# Patient Record
Sex: FEMALE | Race: BLACK OR AFRICAN AMERICAN | Employment: UNEMPLOYED | URBAN - METROPOLITAN AREA
[De-identification: names, ages, dates, MRNs, and addresses within clinical notes are randomized per-mention and may not be internally consistent; named-entity substitution may affect disease eponyms.]

---

## 2020-11-18 ENCOUNTER — HOSPITAL ENCOUNTER (EMERGENCY)
Facility: CLINIC | Age: 40
Discharge: HOME | End: 2020-11-18
Attending: EMERGENCY MEDICINE | Admitting: EMERGENCY MEDICINE
Payer: OTHER GOVERNMENT

## 2020-11-18 ENCOUNTER — APPOINTMENT (OUTPATIENT)
Dept: GENERAL RADIOLOGY | Facility: CLINIC | Age: 40
End: 2020-11-18
Attending: EMERGENCY MEDICINE
Payer: OTHER GOVERNMENT

## 2020-11-18 VITALS
SYSTOLIC BLOOD PRESSURE: 135 MMHG | OXYGEN SATURATION: 98 % | RESPIRATION RATE: 10 BRPM | HEART RATE: 80 BPM | TEMPERATURE: 97.5 F | DIASTOLIC BLOOD PRESSURE: 79 MMHG

## 2020-11-18 DIAGNOSIS — B34.9 VIRAL ILLNESS: ICD-10-CM

## 2020-11-18 LAB
ALBUMIN SERPL-MCNC: 4.1 G/DL (ref 3.4–5)
ALBUMIN UR-MCNC: NEGATIVE MG/DL
ALP SERPL-CCNC: 57 U/L (ref 40–150)
ALT SERPL W P-5'-P-CCNC: 21 U/L (ref 0–50)
ANION GAP SERPL CALCULATED.3IONS-SCNC: 9 MMOL/L (ref 3–14)
APPEARANCE UR: CLEAR
AST SERPL W P-5'-P-CCNC: 12 U/L (ref 0–45)
BASOPHILS # BLD AUTO: 0 10E9/L (ref 0–0.2)
BASOPHILS NFR BLD AUTO: 0.2 %
BILIRUB SERPL-MCNC: 0.3 MG/DL (ref 0.2–1.3)
BILIRUB UR QL STRIP: NEGATIVE
BUN SERPL-MCNC: 10 MG/DL (ref 7–30)
CALCIUM SERPL-MCNC: 9 MG/DL (ref 8.5–10.1)
CAOX CRY #/AREA URNS HPF: ABNORMAL /HPF
CHLORIDE SERPL-SCNC: 104 MMOL/L (ref 94–109)
CO2 SERPL-SCNC: 23 MMOL/L (ref 20–32)
COLOR UR AUTO: ABNORMAL
CREAT SERPL-MCNC: 0.62 MG/DL (ref 0.52–1.04)
D DIMER PPP FEU-MCNC: 0.3 UG/ML FEU (ref 0–0.5)
DIFFERENTIAL METHOD BLD: ABNORMAL
EOSINOPHIL # BLD AUTO: 0 10E9/L (ref 0–0.7)
EOSINOPHIL NFR BLD AUTO: 0.4 %
ERYTHROCYTE [DISTWIDTH] IN BLOOD BY AUTOMATED COUNT: 14.7 % (ref 10–15)
GFR SERPL CREATININE-BSD FRML MDRD: >90 ML/MIN/{1.73_M2}
GLUCOSE SERPL-MCNC: 129 MG/DL (ref 70–99)
GLUCOSE UR STRIP-MCNC: NEGATIVE MG/DL
HCG UR QL: NEGATIVE
HCT VFR BLD AUTO: 33.6 % (ref 35–47)
HGB BLD-MCNC: 11.5 G/DL (ref 11.7–15.7)
HGB UR QL STRIP: ABNORMAL
IMM GRANULOCYTES # BLD: 0 10E9/L (ref 0–0.4)
IMM GRANULOCYTES NFR BLD: 0 %
INTERPRETATION ECG - MUSE: NORMAL
KETONES UR STRIP-MCNC: 5 MG/DL
LEUKOCYTE ESTERASE UR QL STRIP: NEGATIVE
LYMPHOCYTES # BLD AUTO: 1.5 10E9/L (ref 0.8–5.3)
LYMPHOCYTES NFR BLD AUTO: 31.9 %
MCH RBC QN AUTO: 28 PG (ref 26.5–33)
MCHC RBC AUTO-ENTMCNC: 34.2 G/DL (ref 31.5–36.5)
MCV RBC AUTO: 82 FL (ref 78–100)
MONOCYTES # BLD AUTO: 0.4 10E9/L (ref 0–1.3)
MONOCYTES NFR BLD AUTO: 8.3 %
MUCOUS THREADS #/AREA URNS LPF: PRESENT /LPF
NEUTROPHILS # BLD AUTO: 2.7 10E9/L (ref 1.6–8.3)
NEUTROPHILS NFR BLD AUTO: 59.2 %
NITRATE UR QL: NEGATIVE
PH UR STRIP: 6.5 PH (ref 5–7)
PLATELET # BLD AUTO: 237 10E9/L (ref 150–450)
POTASSIUM SERPL-SCNC: 2.9 MMOL/L (ref 3.4–5.3)
PROT SERPL-MCNC: 8.1 G/DL (ref 6.8–8.8)
RBC # BLD AUTO: 4.1 10E12/L (ref 3.8–5.2)
RBC #/AREA URNS AUTO: 1 /HPF (ref 0–2)
SODIUM SERPL-SCNC: 136 MMOL/L (ref 133–144)
SOURCE: ABNORMAL
SP GR UR STRIP: 1.01 (ref 1–1.03)
SQUAMOUS #/AREA URNS AUTO: 2 /HPF (ref 0–1)
TROPONIN I SERPL-MCNC: <0.015 UG/L (ref 0–0.04)
UROBILINOGEN UR STRIP-MCNC: NORMAL MG/DL (ref 0–2)
WBC # BLD AUTO: 4.6 10E9/L (ref 4–11)
WBC #/AREA URNS AUTO: 1 /HPF (ref 0–5)

## 2020-11-18 PROCEDURE — 96374 THER/PROPH/DIAG INJ IV PUSH: CPT

## 2020-11-18 PROCEDURE — 96375 TX/PRO/DX INJ NEW DRUG ADDON: CPT

## 2020-11-18 PROCEDURE — 258N000003 HC RX IP 258 OP 636: Performed by: EMERGENCY MEDICINE

## 2020-11-18 PROCEDURE — 84484 ASSAY OF TROPONIN QUANT: CPT | Performed by: EMERGENCY MEDICINE

## 2020-11-18 PROCEDURE — 85379 FIBRIN DEGRADATION QUANT: CPT | Performed by: EMERGENCY MEDICINE

## 2020-11-18 PROCEDURE — 81001 URINALYSIS AUTO W/SCOPE: CPT | Performed by: EMERGENCY MEDICINE

## 2020-11-18 PROCEDURE — 96361 HYDRATE IV INFUSION ADD-ON: CPT

## 2020-11-18 PROCEDURE — 81025 URINE PREGNANCY TEST: CPT | Performed by: EMERGENCY MEDICINE

## 2020-11-18 PROCEDURE — 99285 EMERGENCY DEPT VISIT HI MDM: CPT | Mod: 25

## 2020-11-18 PROCEDURE — 71045 X-RAY EXAM CHEST 1 VIEW: CPT

## 2020-11-18 PROCEDURE — 93005 ELECTROCARDIOGRAM TRACING: CPT

## 2020-11-18 PROCEDURE — C9803 HOPD COVID-19 SPEC COLLECT: HCPCS

## 2020-11-18 PROCEDURE — 250N000013 HC RX MED GY IP 250 OP 250 PS 637: Performed by: EMERGENCY MEDICINE

## 2020-11-18 PROCEDURE — 93005 ELECTROCARDIOGRAM TRACING: CPT | Mod: 76

## 2020-11-18 PROCEDURE — 85025 COMPLETE CBC W/AUTO DIFF WBC: CPT | Performed by: EMERGENCY MEDICINE

## 2020-11-18 PROCEDURE — U0003 INFECTIOUS AGENT DETECTION BY NUCLEIC ACID (DNA OR RNA); SEVERE ACUTE RESPIRATORY SYNDROME CORONAVIRUS 2 (SARS-COV-2) (CORONAVIRUS DISEASE [COVID-19]), AMPLIFIED PROBE TECHNIQUE, MAKING USE OF HIGH THROUGHPUT TECHNOLOGIES AS DESCRIBED BY CMS-2020-01-R: HCPCS | Performed by: EMERGENCY MEDICINE

## 2020-11-18 PROCEDURE — 80053 COMPREHEN METABOLIC PANEL: CPT | Performed by: EMERGENCY MEDICINE

## 2020-11-18 PROCEDURE — 250N000011 HC RX IP 250 OP 636: Performed by: EMERGENCY MEDICINE

## 2020-11-18 RX ORDER — METHYLPREDNISOLONE SODIUM SUCCINATE 125 MG/2ML
125 INJECTION, POWDER, LYOPHILIZED, FOR SOLUTION INTRAMUSCULAR; INTRAVENOUS ONCE
Status: COMPLETED | OUTPATIENT
Start: 2020-11-18 | End: 2020-11-18

## 2020-11-18 RX ORDER — ZINC SULFATE 50(220)MG
220 CAPSULE ORAL DAILY
Qty: 7 CAPSULE | Refills: 0 | Status: SHIPPED | OUTPATIENT
Start: 2020-11-18 | End: 2020-11-25

## 2020-11-18 RX ORDER — POTASSIUM CHLORIDE 1500 MG/1
40 TABLET, EXTENDED RELEASE ORAL ONCE
Status: COMPLETED | OUTPATIENT
Start: 2020-11-18 | End: 2020-11-18

## 2020-11-18 RX ORDER — SODIUM CHLORIDE 9 MG/ML
INJECTION, SOLUTION INTRAVENOUS CONTINUOUS
Status: DISCONTINUED | OUTPATIENT
Start: 2020-11-18 | End: 2020-11-18 | Stop reason: HOSPADM

## 2020-11-18 RX ORDER — ASCORBIC ACID 500 MG
500 TABLET ORAL 2 TIMES DAILY
Qty: 14 TABLET | Refills: 0 | Status: SHIPPED | OUTPATIENT
Start: 2020-11-18 | End: 2020-11-25

## 2020-11-18 RX ORDER — LORAZEPAM 2 MG/ML
1 INJECTION INTRAMUSCULAR ONCE
Status: COMPLETED | OUTPATIENT
Start: 2020-11-18 | End: 2020-11-18

## 2020-11-18 RX ORDER — PREDNISONE 20 MG/1
TABLET ORAL
Qty: 10 TABLET | Refills: 0 | Status: SHIPPED | OUTPATIENT
Start: 2020-11-18

## 2020-11-18 RX ORDER — KETOROLAC TROMETHAMINE 15 MG/ML
15 INJECTION, SOLUTION INTRAMUSCULAR; INTRAVENOUS ONCE
Status: COMPLETED | OUTPATIENT
Start: 2020-11-18 | End: 2020-11-18

## 2020-11-18 RX ADMIN — KETOROLAC TROMETHAMINE 15 MG: 15 INJECTION, SOLUTION INTRAMUSCULAR; INTRAVENOUS at 18:48

## 2020-11-18 RX ADMIN — SODIUM CHLORIDE: 9 INJECTION, SOLUTION INTRAVENOUS at 20:20

## 2020-11-18 RX ADMIN — POTASSIUM CHLORIDE 40 MEQ: 1500 TABLET, EXTENDED RELEASE ORAL at 20:19

## 2020-11-18 RX ADMIN — SODIUM CHLORIDE 1000 ML: 9 INJECTION, SOLUTION INTRAVENOUS at 18:48

## 2020-11-18 RX ADMIN — METHYLPREDNISOLONE SODIUM SUCCINATE 125 MG: 125 INJECTION, POWDER, FOR SOLUTION INTRAMUSCULAR; INTRAVENOUS at 18:48

## 2020-11-18 RX ADMIN — LORAZEPAM 1 MG: 2 INJECTION INTRAMUSCULAR; INTRAVENOUS at 20:21

## 2020-11-18 ASSESSMENT — ENCOUNTER SYMPTOMS
ARTHRALGIAS: 1
WEAKNESS: 0
CHILLS: 1
LIGHT-HEADEDNESS: 0
APPETITE CHANGE: 1
ABDOMINAL PAIN: 0
ACTIVITY CHANGE: 1
PALPITATIONS: 0
DIAPHORESIS: 0
COUGH: 1
MYALGIAS: 1
ABDOMINAL DISTENTION: 0
SHORTNESS OF BREATH: 1
NUMBNESS: 0
HEADACHES: 0
NAUSEA: 0
FEVER: 1
DIZZINESS: 0
NECK PAIN: 0
VOMITING: 0
FATIGUE: 1

## 2020-11-18 NOTE — ED AVS SNAPSHOT
[facility] Emergency Dept  [address] [address]  Phone: [phone]  Fax: [phone]                                    Seymour Max   MRN: [id number]    Department: [facility] Emergency Dept   Date of Visit: 11/18/2020           After Visit Summary Signature Page    I have received my discharge instructions, and my questions have been answered. I have discussed any challenges I see with this plan with the nurse or doctor.    ..........................................................................................................................................  Patient/Patient Representative Signature      ..........................................................................................................................................  Patient Representative Print Name and Relationship to Patient    ..................................................               ................................................  Date                                   Time    ..........................................................................................................................................  Reviewed by Signature/Title    ...................................................              ..............................................  Date                                               Time          22EPIC Rev 08/18

## 2020-11-19 LAB
SARS-COV-2 RNA SPEC QL NAA+PROBE: NOT DETECTED
SPECIMEN SOURCE: NORMAL

## 2020-11-19 RX ORDER — PREDNISONE 20 MG/1
TABLET ORAL
Qty: 10 TABLET | Refills: 0 | Status: SHIPPED | OUTPATIENT
Start: 2020-11-19

## 2020-11-19 RX ORDER — ZINC SULFATE 50(220)MG
220 CAPSULE ORAL DAILY
Qty: 7 CAPSULE | Refills: 0 | Status: SHIPPED | OUTPATIENT
Start: 2020-11-19 | End: 2020-11-26

## 2020-11-19 RX ORDER — ASCORBIC ACID 500 MG
500 TABLET ORAL 2 TIMES DAILY
Qty: 14 TABLET | Refills: 0 | Status: SHIPPED | OUTPATIENT
Start: 2020-11-19 | End: 2020-11-26

## 2020-11-19 NOTE — ED PROVIDER NOTES
History     Chief Complaint:  Shortness of Breath    This patient was roomed in ED09.  HPI   Seymour Max is a 40 year old female who presents with chest pain and shortness of breath.  Patient states that her symptoms over the weekend with generalized malaise and fatigue and body aches are most concerningly midsternal chest discomfort.  She describes as an aching in her central chest that has not gotten better or worse since it started on Saturday.  However, patient became more concerned this evening when she developed shortness of breath.  She denies cough.  No fever no nausea or vomiting.  No diaphoresis.  She has not had any lightheadedness or dizziness.  Patient denies any abdominal pain.  No urinary symptoms of dysuria, urgency, frequency, hematuria.  She denies any known Covid exposure.    Allergies:  No Known Drug Allergies    Medications:    Medications reviewed. No pertinent medications.    Past Medical History:    Past medical history reviewed. No pertinent medical history.    Past Surgical History:    Surgical history reviewed. No pertinent surgical history.    Family History:    Family history reviewed. No pertinent family history.    Social History:  The patient presented to the ED alone, due to COVID protocols in place.    Review of Systems   Constitutional: Positive for activity change, appetite change, chills, fatigue and fever. Negative for diaphoresis.   HENT: Positive for congestion.    Respiratory: Positive for cough and shortness of breath.    Cardiovascular: Positive for chest pain. Negative for palpitations and leg swelling.   Gastrointestinal: Negative for abdominal distention, abdominal pain, nausea and vomiting.   Musculoskeletal: Positive for arthralgias and myalgias. Negative for neck pain.   Neurological: Negative for dizziness, weakness, light-headedness, numbness and headaches.   All other systems reviewed and are negative.      Physical Exam     Patient Vitals for the past 24  hrs:   BP Temp Temp src Pulse Resp SpO2   11/18/20 2109 135/79 -- -- -- -- --   11/18/20 2045 -- -- -- 80 10 98 %   11/18/20 2030 -- -- -- 88 14 97 %   11/18/20 1945 -- -- -- 107 20 100 %   11/18/20 1930 -- -- -- 115 21 --   11/18/20 1915 -- -- -- 96 18 99 %   11/18/20 1900 -- -- -- 105 24 100 %   11/18/20 1845 -- -- -- 106 22 100 %   11/18/20 1830 (!) 147/86 -- -- 128 24 100 %   11/18/20 1815 (!) 157/86 -- -- 116 (!) 32 100 %   11/18/20 1751 (!) 159/97 97.5  F (36.4  C) Temporal 110 18 --       Physical Exam    Constitutional: Alert  HENT:    Nose: Nose normal.    Mouth/Throat: Oropharynx is clear, mucous membranes are moist  Eyes: EOM are normal. Pupils are equal, round, and reactive to light.   CV: Regular rate and rhythm, no murmurs, rubs or gallops.  Resp: Clear lungs to auscultation, all lung fields. Normal respiratory effort.   GI: Soft, non-distended. There is no tenderness. No rebound or guarding.   MSK: Normal range of motion. No deformity.   Neurological:   A/Ox3  5/5 strength is symmetric to the upper and lower extremities;   Sensation intact to light touch throughout the upper and lower extremities;   Skin: Skin is warm and dry.    Emergency Department Course     ECG:  ECG taken at 1757, ECG read at 1816  Sinus tachycardia  Right atrial enlargement  Borderline ECG  No ST elevation or depression   No T wave inversion   No evidence of HOCM, ARVD, Brugada, WPW, AV block, prolonged QT.  Rate 111 bpm. MS interval 170 ms. QRS duration 82 ms. QT/QTc 342/465 ms. P-R-T axes 71 8 50.     ECG #2:  ECG taken at 2006, ECG read at 2016  Normal sinus rhythm  Normal ECG  No ST elevation or depression   No T wave inversion   No evidence of HOCM, ARVD, Brugada, WPW, AV block, prolonged QT.  No significant change when compared to EKG dated 11/18/20.   Rate 95 bpm. MS interval 168 ms. QRS duration 84 ms. QT/QTc 380/477 ms. P-R-T axes 63 17 53.     Imaging:  Radiology findings were communicated with the patient who voiced  understanding of the findings.    XR Chest Port 1 View  No focal infiltrate, pleural effusion or pneumothorax. The  cardiac and mediastinal silhouettes are normal.  Reading per radiology    Laboratory:  Laboratory findings were communicated with the patient who voiced understanding of the findings.    UA with Microscopic: Ketones 5 (A), Blood Trace (A), Squamous Epithelial 2 (H), Mucous Urine Present (A), Calcium Oxalate Few (A) o/w Negative    CBC: WBC 4.6, HGB 11.5 (H),   CMP: Potassium 2.9 (L), Glucose 129 (H) o/w WNL (Creatinine 0.62)    Troponin (Collected ): <0.015    D Dimer (Collected ): 0.3    HCG qualitative Urine: Negative    Symptomatic COVID-19 Virus (Coronavirus) by PCR Nasopharyngeal swab: Pending    Interventions:   NS 1L IV Bolus   Toradol 15mg IV   Solu-Medrol 125mg IV   Potassium Chloride 40mEq PO   Ativan 1mg IV    Emergency Department Course:    Past medical records, nursing notes, and vitals reviewed.  : I performed an exam of the patient and obtained history, as documented above.     IV was inserted and blood was drawn for laboratory testing, results above.    The patient provided a urine sample here in the emergency department. This was sent for laboratory testing, findings above.    The patient was sent for a XR while in the emergency department, findings above    : I rechecked and updated the patient.     I personally reviewed the laboratory and imaging results with the Patient and answered all related questions prior to discharge.     Findings and plan explained to the Patient. Patient discharged home with instructions regarding supportive care, medications, and reasons to return. The importance of close follow-up was reviewed.    Impression & Plan      Medical Decision Makin year old female who comes in with chest pain and shortness of breath. Exam as above. EKG non-ischemic. Labs including CBC, CMP, troponin, D-Dimer. D-Dimer negative.  Troponin negative. Electrolytes within normal limits except for a low potassium. White count unremarkable. No evidence of anemia. CXR unremarkable. Clinical suspicion for ACS, dissection, pneumonia, pneumothorax, pulmonary embolism, perforated viscus is low at this time. COVID swab pending. Findings at this time are most consistent with a viral illness - which could be COVID versus other seasonal virus.     Covid-19  Seymour Max was evaluated during a global COVID-19 pandemic, which necessitated consideration that the patient might be at risk for infection with the SARS-CoV-2 virus that causes COVID-19.   Applicable protocols for evaluation were followed during the patient's care.   COVID-19 was considered as part of the patient's evaluation. The plan for testing is:  a test was obtained during this visit.    Diagnosis:    ICD-10-CM    1. Viral illness  B34.9        Disposition:   The patient is discharged to home.    Discharge Medications:  Discharge Medication List as of 11/18/2020  9:10 PM      START taking these medications    Details   melatonin 5 MG CAPS Take 1 capsule by mouth daily for 14 days, Disp-14 capsule, R-0, E-Prescribe      predniSONE (DELTASONE) 20 MG tablet Take two tablets (= 40mg) each day for 5 (five) days, Disp-10 tablet, R-0, E-Prescribe      vitamin C (ASCORBIC ACID) 500 MG tablet Take 1 tablet (500 mg) by mouth 2 times daily for 7 days, Disp-14 tablet, R-0, E-Prescribe      zinc sulfate (ZINCATE) 220 (50 Zn) MG capsule Take 1 capsule (220 mg) by mouth daily for 7 days, Disp-7 capsule, R-0, E-Prescribe             Scribe Disclosure:  Jermaine ARANA, am serving as a scribe at 6:03 PM on 11/18/2020 to document services personally performed by Silvio Chu DO based on my observations and the provider's statements to me.  [facility] EMERGENCY DEPT       Silvio Chu DO  11/18/20 2922

## 2020-11-20 ENCOUNTER — HOSPITAL ENCOUNTER (EMERGENCY)
Facility: CLINIC | Age: 40
Discharge: HOME | End: 2020-11-20
Attending: EMERGENCY MEDICINE | Admitting: EMERGENCY MEDICINE
Payer: OTHER GOVERNMENT

## 2020-11-20 VITALS
DIASTOLIC BLOOD PRESSURE: 77 MMHG | SYSTOLIC BLOOD PRESSURE: 133 MMHG | HEART RATE: 61 BPM | TEMPERATURE: 97.4 F | OXYGEN SATURATION: 100 % | RESPIRATION RATE: 14 BRPM

## 2020-11-20 DIAGNOSIS — R53.1 GENERAL WEAKNESS: ICD-10-CM

## 2020-11-20 LAB
ALBUMIN UR-MCNC: NEGATIVE MG/DL
ANION GAP SERPL CALCULATED.3IONS-SCNC: 8 MMOL/L (ref 3–14)
APPEARANCE UR: CLEAR
BASOPHILS # BLD AUTO: 0 10E9/L (ref 0–0.2)
BASOPHILS NFR BLD AUTO: 0.2 %
BILIRUB UR QL STRIP: NEGATIVE
BUN SERPL-MCNC: 10 MG/DL (ref 7–30)
CALCIUM SERPL-MCNC: 9.3 MG/DL (ref 8.5–10.1)
CHLORIDE SERPL-SCNC: 111 MMOL/L (ref 94–109)
CO2 SERPL-SCNC: 22 MMOL/L (ref 20–32)
COLOR UR AUTO: ABNORMAL
CREAT SERPL-MCNC: 0.58 MG/DL (ref 0.52–1.04)
DIFFERENTIAL METHOD BLD: ABNORMAL
EOSINOPHIL # BLD AUTO: 0 10E9/L (ref 0–0.7)
EOSINOPHIL NFR BLD AUTO: 0 %
ERYTHROCYTE [DISTWIDTH] IN BLOOD BY AUTOMATED COUNT: 14.6 % (ref 10–15)
GFR SERPL CREATININE-BSD FRML MDRD: >90 ML/MIN/{1.73_M2}
GLUCOSE SERPL-MCNC: 129 MG/DL (ref 70–99)
GLUCOSE UR STRIP-MCNC: NEGATIVE MG/DL
HCG SERPL QL: NEGATIVE
HCT VFR BLD AUTO: 33.4 % (ref 35–47)
HGB BLD-MCNC: 11.6 G/DL (ref 11.7–15.7)
HGB UR QL STRIP: NEGATIVE
IMM GRANULOCYTES # BLD: 0 10E9/L (ref 0–0.4)
IMM GRANULOCYTES NFR BLD: 0.2 %
INTERPRETATION ECG - MUSE: NORMAL
KETONES UR STRIP-MCNC: 10 MG/DL
LEUKOCYTE ESTERASE UR QL STRIP: NEGATIVE
LYMPHOCYTES # BLD AUTO: 0.5 10E9/L (ref 0.8–5.3)
LYMPHOCYTES NFR BLD AUTO: 10.2 %
MCH RBC QN AUTO: 28.9 PG (ref 26.5–33)
MCHC RBC AUTO-ENTMCNC: 34.7 G/DL (ref 31.5–36.5)
MCV RBC AUTO: 83 FL (ref 78–100)
MONOCYTES # BLD AUTO: 0 10E9/L (ref 0–1.3)
MONOCYTES NFR BLD AUTO: 0.8 %
NEUTROPHILS # BLD AUTO: 4.3 10E9/L (ref 1.6–8.3)
NEUTROPHILS NFR BLD AUTO: 88.6 %
NITRATE UR QL: NEGATIVE
NRBC # BLD AUTO: 0 10*3/UL
NRBC BLD AUTO-RTO: 0 /100
PH UR STRIP: 8 PH (ref 5–7)
PLATELET # BLD AUTO: 243 10E9/L (ref 150–450)
POTASSIUM SERPL-SCNC: 3.2 MMOL/L (ref 3.4–5.3)
RBC # BLD AUTO: 4.02 10E12/L (ref 3.8–5.2)
RBC #/AREA URNS AUTO: 0 /HPF (ref 0–2)
SODIUM SERPL-SCNC: 141 MMOL/L (ref 133–144)
SOURCE: ABNORMAL
SP GR UR STRIP: 1 (ref 1–1.03)
TROPONIN I SERPL-MCNC: <0.015 UG/L (ref 0–0.04)
UROBILINOGEN UR STRIP-MCNC: NORMAL MG/DL (ref 0–2)
WBC # BLD AUTO: 4.8 10E9/L (ref 4–11)
WBC #/AREA URNS AUTO: 0 /HPF (ref 0–5)

## 2020-11-20 PROCEDURE — C9803 HOPD COVID-19 SPEC COLLECT: HCPCS

## 2020-11-20 PROCEDURE — 84703 CHORIONIC GONADOTROPIN ASSAY: CPT | Performed by: EMERGENCY MEDICINE

## 2020-11-20 PROCEDURE — 258N000003 HC RX IP 258 OP 636: Performed by: EMERGENCY MEDICINE

## 2020-11-20 PROCEDURE — 250N000013 HC RX MED GY IP 250 OP 250 PS 637: Performed by: EMERGENCY MEDICINE

## 2020-11-20 PROCEDURE — 96360 HYDRATION IV INFUSION INIT: CPT

## 2020-11-20 PROCEDURE — 85025 COMPLETE CBC W/AUTO DIFF WBC: CPT | Performed by: EMERGENCY MEDICINE

## 2020-11-20 PROCEDURE — 84484 ASSAY OF TROPONIN QUANT: CPT | Performed by: EMERGENCY MEDICINE

## 2020-11-20 PROCEDURE — 99283 EMERGENCY DEPT VISIT LOW MDM: CPT | Mod: 25

## 2020-11-20 PROCEDURE — 80048 BASIC METABOLIC PNL TOTAL CA: CPT | Performed by: EMERGENCY MEDICINE

## 2020-11-20 PROCEDURE — U0003 INFECTIOUS AGENT DETECTION BY NUCLEIC ACID (DNA OR RNA); SEVERE ACUTE RESPIRATORY SYNDROME CORONAVIRUS 2 (SARS-COV-2) (CORONAVIRUS DISEASE [COVID-19]), AMPLIFIED PROBE TECHNIQUE, MAKING USE OF HIGH THROUGHPUT TECHNOLOGIES AS DESCRIBED BY CMS-2020-01-R: HCPCS | Performed by: EMERGENCY MEDICINE

## 2020-11-20 PROCEDURE — 81001 URINALYSIS AUTO W/SCOPE: CPT | Performed by: EMERGENCY MEDICINE

## 2020-11-20 RX ORDER — SODIUM CHLORIDE 9 MG/ML
INJECTION, SOLUTION INTRAVENOUS CONTINUOUS
Status: DISCONTINUED | OUTPATIENT
Start: 2020-11-20 | End: 2020-11-20 | Stop reason: HOSPADM

## 2020-11-20 RX ORDER — POTASSIUM CHLORIDE 1500 MG/1
40 TABLET, EXTENDED RELEASE ORAL ONCE
Status: COMPLETED | OUTPATIENT
Start: 2020-11-20 | End: 2020-11-20

## 2020-11-20 RX ADMIN — SODIUM CHLORIDE 1000 ML: 9 INJECTION, SOLUTION INTRAVENOUS at 18:49

## 2020-11-20 RX ADMIN — POTASSIUM CHLORIDE 40 MEQ: 1500 TABLET, EXTENDED RELEASE ORAL at 19:37

## 2020-11-20 ASSESSMENT — ENCOUNTER SYMPTOMS
SHORTNESS OF BREATH: 0
VOMITING: 0
WEAKNESS: 1
APPETITE CHANGE: 0
CHILLS: 0
COUGH: 0
LIGHT-HEADEDNESS: 0
FEVER: 0
NAUSEA: 0
DIARRHEA: 0

## 2020-11-20 NOTE — ED AVS SNAPSHOT
[facility] Emergency Dept  [address] [address]  Phone: [phone]  Fax: [phone]                                    Telly Max   MRN: [id number]    Department: [facility] Emergency Dept   Date of Visit: 11/20/2020           After Visit Summary Signature Page    I have received my discharge instructions, and my questions have been answered. I have discussed any challenges I see with this plan with the nurse or doctor.    ..........................................................................................................................................  Patient/Patient Representative Signature      ..........................................................................................................................................  Patient Representative Print Name and Relationship to Patient    ..................................................               ................................................  Date                                   Time    ..........................................................................................................................................  Reviewed by Signature/Title    ...................................................              ..............................................  Date                                               Time          22EPIC Rev 08/18

## 2020-11-21 LAB
LABORATORY COMMENT REPORT: NORMAL
SARS-COV-2 RNA SPEC QL NAA+PROBE: NEGATIVE
SARS-COV-2 RNA SPEC QL NAA+PROBE: NORMAL
SPECIMEN SOURCE: NORMAL
SPECIMEN SOURCE: NORMAL

## 2020-11-21 NOTE — ED PROVIDER NOTES
History     Chief Complaint:  Generalized Weakness    [facility]  Chris Venu Max is a 40 year old female who presents for evaluation of generalized weakness. The patient reports that 2 days ago she was seen in this ED with symptoms of chest pain and shortness of breath. She comes in today because today she suddenly developed generalized weakness. She states that she has been able to eat and drink okay and denies any cough, fever, chills, nausea, vomiting, diarrhea, changes in urination or bowel movements, rashes, and lightheadedness. Lab results from ED visit 2 days ago detailed below HPI.     Cardiac and DVT Risk Factors:  The patient denies a history of diabetes, hypertension, high cholesterol, known cardiac disease or current smoking. Patient denies any history of PE,DVT, or clotting disorders. Patient denies any recent extended immobilization or surgery.     Labs Obtained from ED visit on 20:  UA with Microscopic: Ketones 5 (A), Blood Trace (A), Squamous Epithelial 2 (H), Mucous Urine Present (A), Calcium Oxalate Few (A) o/w Negative  CBC: WBC 4.6, HGB 11.5 (H),   CMP: Potassium 2.9 (L), Glucose 129 (H) o/w WNL (Creatinine 0.62)  Troponin (Collected ): <0.015  D Dimer (Collected ): 0.3  HCG qualitative Urine: Negative    Allergies:  No Known Allergies    Medications:    The patient is currently on no regular medications.    Past Medical History:    The patient denies any significant past medical history.    Past Surgical History:        Family History:    No past pertinent family history.    Social History:  The patient presents to the ED alone.   Marital Status:   Tobacco Use: Never  Alcohol Use: No  Drug Use: No    Review of Systems   Constitutional: Negative for appetite change, chills and fever.   Respiratory: Negative for cough and shortness of breath.    Cardiovascular: Negative for chest pain.   Gastrointestinal: Negative for diarrhea, nausea and vomiting.   Skin:  Negative for rash.   Neurological: Positive for weakness. Negative for light-headedness.   All other systems reviewed and are negative.      Physical Exam     Patient Vitals for the past 24 hrs:   BP Temp Temp src Pulse Resp SpO2   11/20/20 1936 133/77 -- -- 61 14 100 %   11/20/20 1845 118/80 -- -- 67 -- --   11/20/20 1814 (!) 145/84 97.4  F (36.3  C) Oral 90 18 98 %       Physical Exam  Constitutional: Well developed, nontox appearance  Head: Atraumatic.   Mouth/Throat: Oropharynx is clear and moist.   Neck:  no stridor  Eyes: no scleral icterus  Cardiovascular: RRR, 2+ bilat radial pulses  Pulmonary/Chest: nml resp effort, Clear BS bilat  Abdominal: ND, soft, NT, no rebound or guarding   : no CVA tenderness bilat  Ext: Warm, well perfused, no edema  Neurological: A&O, symmetric facies, moves ext x4  Skin: Skin is warm and dry.   Psychiatric:  Mildly anxious otherwise behavior is normal. Thought content normal.   Nursing note and vitals reviewed.      Emergency Department Course     Laboratory:  Laboratory findings were communicated with the patient who voiced understanding of the findings.    CBC: WBC 4.8, HGB 11.6 (L)<      BMP: K 3.2 (L), Chloride 111 (H), Glucose 129 (H), o/w WNL (Creat 0.58)    Troponin: <0.015    HCG Qualitative Pregnancy: Negative    Asymptomatic COVID-19 PCR: In Process    UAM: Ketone 10, SGR 1.002 (L), pH 8.0 (H)     Interventions:  1849 NaCl 0.9% 1000 mL  1937 Potassium Chloride ER 40 mEq Oral    Emergency Department Course:  Past medical records, nursing notes, and vitals reviewed.    1814 I performed an exam of the patient as documented above.      IV was inserted and blood was drawn for laboratory testing, results above.     The patient provided a urine sample here in the emergency department. This was sent for laboratory testing, findings above.    1947 I rechecked the patient and discussed the results of her workup thus far.     Findings and plan explained to the Patient.  Patient discharged home with instructions regarding supportive care, medications, and reasons to return. The importance of close follow-up was reviewed.    I personally reviewed the laboratory results with the Patient and answered all related questions prior to discharge.     Impression & Plan     Medical Decision Makin year old female presenting w/ generalized weakness     DDx includes electrolyte abnormality, dehydration, viral syndrome, anxiety, generalized weakness NOS.  The patient was evaluated in the emergency department on 2020 with symptoms consisting of chest pain or shortness of breath which is since resolved.  Work-up at that time was reassuring.  Given no new respiratory or thoracic symptoms, repeat imaging not pursued.  Labs as noted above and significant for mild hypokalemia, otherwise unremarkable.  Less likely myocarditis, pericarditis, ACS, PE or dissection.  Given reassuring work-up and normal vital signs, at this time I feel the patient is safe for discharge.  Recommendations given regarding follow up with PCP and return to the emergency department as needed for new or worsening symptoms.  Patient counseled on all results, disposition and diagnosis.  They are understanding and agreeable to plan. Patient discharged in stable condition.       Diagnosis:    ICD-10-CM    1. General weakness  R53.1        Disposition:  Discharged to home.    Scribe Disclosure:  I, Jack Romelia, am serving as a scribe at 6:11 PM on 2020 to document services personally performed by Levi Lawton MD based on my observations and the provider's statements to me.      Levi Lawton MD  [phone]

## 2020-11-21 NOTE — ED TRIAGE NOTES
Patient states generalized weakness for the past few days.  Patient did urinate on herself enroute.

## 2020-11-21 NOTE — DISCHARGE INSTRUCTIONS
1. -Take acetaminophen 500 to 1000 mg by mouth every 4 to 6 hours as needed for pain or fever.  Do not take more than 4000 mg in 24 hours.  Do not take within 6 hours of another acetaminophen containing medication such as norco (vicodin) or percocet.  2.